# Patient Record
Sex: MALE | Race: WHITE | NOT HISPANIC OR LATINO | Employment: FULL TIME | URBAN - NONMETROPOLITAN AREA
[De-identification: names, ages, dates, MRNs, and addresses within clinical notes are randomized per-mention and may not be internally consistent; named-entity substitution may affect disease eponyms.]

---

## 2024-01-25 ENCOUNTER — OFFICE VISIT (OUTPATIENT)
Dept: URGENT CARE | Facility: CLINIC | Age: 64
End: 2024-01-25
Payer: COMMERCIAL

## 2024-01-25 VITALS
DIASTOLIC BLOOD PRESSURE: 62 MMHG | HEART RATE: 87 BPM | SYSTOLIC BLOOD PRESSURE: 118 MMHG | OXYGEN SATURATION: 98 % | RESPIRATION RATE: 18 BRPM | TEMPERATURE: 98.7 F

## 2024-01-25 DIAGNOSIS — S05.01XA ABRASION OF RIGHT CORNEA, INITIAL ENCOUNTER: Primary | ICD-10-CM

## 2024-01-25 PROCEDURE — 99203 OFFICE O/P NEW LOW 30 MIN: CPT

## 2024-01-25 PROCEDURE — 65205 REMOVE FOREIGN BODY FROM EYE: CPT

## 2024-01-25 RX ORDER — TOBRAMYCIN 3 MG/ML
1 SOLUTION/ DROPS OPHTHALMIC
Qty: 5 ML | Refills: 0 | Status: SHIPPED | OUTPATIENT
Start: 2024-01-25

## 2024-01-25 NOTE — PATIENT INSTRUCTIONS
Use prescribed antibiotic eyedrops as instructed to the right eye.  Avoid rubbing your eye.  May use saline eyedrops as provided in the clinic at home.  If you do not have any improvement in the next few days to week, follow-up with your eye doctor.  Wear protective eyewear as discussed when doing woodworking.  Go to the emergency room if any symptoms worsen.  Follow up with PCP in 3-5 days.  Proceed to  ER if symptoms worsen.  Corneal Abrasion   WHAT YOU NEED TO KNOW:   A corneal abrasion is a scratch on the cornea of your eye. The cornea is the clear layer that covers the front of your eye. A small scratch may heal in 1 to 2 days. Deeper or larger scratches may take longer to heal.        DISCHARGE INSTRUCTIONS:   Call your doctor or ophthalmologist if:   Your eye pain or vision gets worse.    You have yellow or green drainage from your eye.    You have questions or concerns about your condition or care.    Medicines:   Medicines  may be given in the form of eyedrops or ointment to help prevent an eye infection. You may also be given eyedrops to decrease pain.    Take your medicine as directed.  Contact your healthcare provider if you think your medicine is not helping or if you have side effects. Tell your provider if you are allergic to any medicine. Keep a list of the medicines, vitamins, and herbs you take. Include the amounts, and when and why you take them. Bring the list or the pill bottles to follow-up visits. Carry your medicine list with you in case of an emergency.    Care for your eyes:   Get regular eye exams.  Get your eyes checked at least every year.    Eat healthy foods.  Fresh fruits and vegetables that are rich in vitamins A and C may help with your vision. Foods such as sweet potatoes, apricots, and carrots are rich in nutrients for the eyes.            Take care of your contacts or glasses.  Store, clean, and use your contacts or glasses as directed. Replace your glasses or contact lenses as  often as your healthcare provider suggests.    Decrease eye strain.  Rest your eyes, especially after you read or use a computer for long periods of time. Get plenty of sleep at night. Use lights that reduce glare in your home, school, or workplace.    Wear dark sunglasses.  This will help prevent pain and light sensitivity. Make sure the sunglasses have UVA and UVB protection. This will protect your eyes when you go outside.    Use eyedrops safely.  If your treatment plan includes eyedrops, it is important to use them as directed. Your provider may give you detailed instructions to follow. The eyedrops may also come with safety instructions. Follow all instructions to help prevent an infection. Do not touch the tip of the bottle to your eye. Germs from your eye can spread to the medicine bottle.       Help prevent corneal abrasions:   Remove your contact lenses if your eyes feel dry or irritated.    Wash your hands if you need to touch your eyes or your face.         Trim your fingernails so you cannot scratch your eye.    Wear protective eyewear when you work with chemicals, wood, dust, or metal.    Wear protective eyewear when you play sports.    Do not wear your contacts for longer than you should.    Do not sleep with your contacts in.    Do not wear glitter makeup. Glitter can easily get into your eyes and under contact lenses.    Follow up with your doctor or ophthalmologist as directed:  Write down your questions so you remember to ask them during your visits.  © Copyright [employer] 2023 Information is for End User's use only and may not be sold, redistributed or otherwise used for commercial purposes.  The above information is an  only. It is not intended as medical advice for individual conditions or treatments. Talk to your doctor, nurse or pharmacist before following any medical regimen to see if it is safe and effective for you.

## 2024-01-25 NOTE — PROGRESS NOTES
[facility]        NAME: Steve Hammond is a 63 y.o. male  : 1960    MRN: [id number]  DATE: 2024  TIME: 1:05 PM    Assessment and Plan   Abrasion of right cornea, initial encounter [S05.01XA]  1. Abrasion of right cornea, initial encounter  tobramycin (Tobrex) 0.3 % SOLN        Eye flush used with tetracaine drops and fluorescein stain.  Small abrasion to the cornea at around the 9 o'clock position.  No visible foreign body.  Patient states that he felt like he had a foreign body since this morning, however states it floated away.  Mild conjunctival injection.  No drainage or discharge.  Will start on Tobrex.  Advised to follow-up with family doctor/eye doctor.  Advised to go to the ER if any symptoms worsen.    Patient Instructions     Use prescribed antibiotic eyedrops as instructed to the right eye.  Avoid rubbing your eye.  May use saline eyedrops as provided in the clinic at home.  If you do not have any improvement in the next few days to week, follow-up with your eye doctor.  Wear protective eyewear as discussed when doing woodworking.  Go to the emergency room if any symptoms worsen.  Follow up with PCP in 3-5 days.  Proceed to  ER if symptoms worsen.    Chief Complaint     Chief Complaint   Patient presents with    Foreign Body in Eye     Started this am   Looking up, and felt like something went into his right eye           History of Present Illness       63-year-old male presents to the clinic for foreign body sensation in right eye. States that it started this morning.  States he was looking up and felt like something went into his eye.  He denies any known particular object getting inside.  Admits to some blurred vision.  PT states that it felt like it improved on its way over to the clinic today.  Feels like the possible foreign body fell out.  Denies any drainage, fever, chills.        Review of Systems   Review of Systems   Constitutional: Negative.    HENT: Negative.      Eyes:  Positive for pain, redness and visual disturbance. Negative for photophobia.   Respiratory: Negative.     Cardiovascular: Negative.    Neurological: Negative.          Current Medications       Current Outpatient Medications:     tobramycin (Tobrex) 0.3 % SOLN, Administer 1 drop to the right eye every 4 (four) hours while awake, Disp: 5 mL, Rfl: 0    Current Allergies     Allergies as of 01/25/2024 - Reviewed 01/25/2024   Allergen Reaction Noted    Latex Dermatitis, Hives, and Rash 01/19/2010    Vancomycin Dermatitis, Hives, and Rash 01/19/2010    Fentanyl GI Intolerance 07/13/2022            The following portions of the patient's history were reviewed and updated as appropriate: allergies, current medications, past family history, past medical history, past social history, past surgical history and problem list.     Past Medical History:   Diagnosis Date    Bowel obstruction (HCC)        Past Surgical History:   Procedure Laterality Date    ABDOMINAL SURGERY      CHOLECYSTECTOMY         No family history on file.      Medications have been verified.        Objective   /62   Pulse 87   Temp 98.7 °F (37.1 °C)   Resp 18   SpO2 98%        Physical Exam     Physical Exam  Constitutional:       General: He is not in acute distress.     Appearance: Normal appearance. He is not ill-appearing or diaphoretic.   HENT:      Head: Normocephalic and atraumatic.   Eyes:      General: Lids are normal.         Right eye: No foreign body, discharge or hordeolum.         Left eye: No discharge or hordeolum.      Extraocular Movements: Extraocular movements intact.      Right eye: Normal extraocular motion and no nystagmus.      Left eye: Normal extraocular motion and no nystagmus.      Conjunctiva/sclera:      Right eye: Right conjunctiva is injected.      Left eye: Left conjunctiva is not injected.      Pupils: Pupils are equal, round, and reactive to light.      Comments: No visible foreign body in the right  "eye.  It was irrigated extensively with saline eyewash.  No drainage or discharge.   Cardiovascular:      Rate and Rhythm: Normal rate and regular rhythm.      Pulses: Normal pulses.      Heart sounds: Normal heart sounds.   Pulmonary:      Effort: Pulmonary effort is normal. No respiratory distress.      Breath sounds: Normal breath sounds.   Skin:     General: Skin is warm and dry.      Capillary Refill: Capillary refill takes less than 2 seconds.   Neurological:      General: No focal deficit present.      Mental Status: He is alert and oriented to person, place, and time. Mental status is at baseline.   Psychiatric:         Mood and Affect: Mood normal.       Universal Protocol:  Consent: Verbal consent obtained.  Risks and benefits: risks, benefits and alternatives were discussed  Consent given by: patient  Time out: Immediately prior to procedure a \"time out\" was called to verify the correct patient, procedure, equipment, support staff and site/side marked as required.  Timeout called at: 1/25/2024 12:55 PM.  Patient understanding: patient states understanding of the procedure being performed  Patient consent: the patient's understanding of the procedure matches consent given  Required items: required blood products, implants, devices, and special equipment available  Patient identity confirmed: verbally with patient  Foreign body removal    Date/Time: 1/25/2024 1:00 PM    Performed by: Carlos Lee PA-C  Authorized by: Carlos Lee PA-C  Body area: eye  Location: right eye.    Anesthesia:  Local Anesthetic: tetracaine drops  Anesthetic total (drops): 1  Sedation:  Patient sedated: no  Patient restrained: no  Patient cooperative: yes  Localization method: eyelid eversion (woods lamp)  Eye examined with fluorescein.  Fluorescein uptake.  Corneal abrasion size: small  Corneal abrasion location: lateral  No residual rust ring present.  Depth: superficial  0 objects recovered.  Post-procedure " assessment: no visible foreign body.  Patient tolerance: patient tolerated the procedure well with no immediate complications  Comments: Tobrex antibiotic eye drops sent to pharmacy to begin today.

## 2025-04-07 ENCOUNTER — OFFICE VISIT (OUTPATIENT)
Dept: URGENT CARE | Facility: CLINIC | Age: 65
End: 2025-04-07
Payer: COMMERCIAL

## 2025-04-07 VITALS
DIASTOLIC BLOOD PRESSURE: 72 MMHG | RESPIRATION RATE: 14 BRPM | SYSTOLIC BLOOD PRESSURE: 124 MMHG | OXYGEN SATURATION: 95 % | HEART RATE: 106 BPM | BODY MASS INDEX: 23.48 KG/M2 | WEIGHT: 164 LBS | HEIGHT: 70 IN | TEMPERATURE: 99.1 F

## 2025-04-07 DIAGNOSIS — J18.9 PNEUMONIA OF LEFT LOWER LOBE DUE TO INFECTIOUS ORGANISM: Primary | ICD-10-CM

## 2025-04-07 PROCEDURE — 99214 OFFICE O/P EST MOD 30 MIN: CPT | Performed by: PHYSICIAN ASSISTANT

## 2025-04-07 RX ORDER — BUDESONIDE AND FORMOTEROL FUMARATE DIHYDRATE 80; 4.5 UG/1; UG/1
AEROSOL RESPIRATORY (INHALATION)
COMMUNITY
Start: 2025-03-31

## 2025-04-07 RX ORDER — BENZONATATE 200 MG/1
200 CAPSULE ORAL 3 TIMES DAILY PRN
Qty: 20 CAPSULE | Refills: 0 | Status: SHIPPED | OUTPATIENT
Start: 2025-04-07

## 2025-04-07 RX ORDER — AZITHROMYCIN 250 MG/1
TABLET, FILM COATED ORAL
Qty: 6 TABLET | Refills: 0 | Status: SHIPPED | OUTPATIENT
Start: 2025-04-07 | End: 2025-04-11

## 2025-04-07 RX ORDER — ALBUTEROL SULFATE 90 UG/1
2 INHALANT RESPIRATORY (INHALATION) EVERY 6 HOURS PRN
Qty: 8.5 G | Refills: 0 | Status: SHIPPED | OUTPATIENT
Start: 2025-04-07

## 2025-04-07 RX ORDER — ATORVASTATIN CALCIUM 20 MG/1
TABLET, FILM COATED ORAL
COMMUNITY
Start: 2025-03-30

## 2025-04-07 NOTE — PROGRESS NOTES
[facility]        NAME: Steve Hammond is a 64 y.o. male  : 1960    MRN: [id number]  DATE: 2025  TIME: 8:27 AM    Assessment and Plan   Pneumonia of left lower lobe due to infectious organism [J18.9]  1. Pneumonia of left lower lobe due to infectious organism  benzonatate (TESSALON) 200 MG capsule    albuterol (ProAir HFA) 90 mcg/act inhaler    azithromycin (ZITHROMAX) 250 mg tablet    amoxicillin-clavulanate (AUGMENTIN) 875-125 mg per tablet          Results        Patient Instructions     Assessment & Plan    Tessalon, Albuterol, Azithromycin and Augmentin as prescribed.  Take Augmentin with food.   Fluids and rest (Warm water with honey and lemon)  Tylenol for pain fever  Follow up with PCP in 3-5 days.  Proceed to  ER if symptoms worsen.    If tests have been performed at [facility], our office will contact you with results if changes need to be made to the care plan discussed with you at the visit.  You can review your full results on [facility] MyChart.    Eat yogurt with live and active cultures and/or take a probiotic at least 3 hours before or after antibiotic dose. Monitor stool for diarrhea and/or blood. If this occurs, contact primary care doctor ASAP.       Chief Complaint     Chief Complaint   Patient presents with    Cough     Started yesterday. Still has headache. OTC Nyquill taken last night.     Headache    Wheezing    chest congestion         History of Present Illness     History of Present Illness      Cough  This is a new problem. The current episode started yesterday. Associated symptoms include headaches, myalgias and wheezing. Pertinent negatives include no chills, ear pain, fever, postnasal drip, rash, rhinorrhea, sore throat or shortness of breath. Treatments tried: OTC NyQuil.       Review of Systems   Review of Systems   Constitutional:  Negative for chills and fever.   HENT:  Negative for congestion, dental problem, ear discharge, ear pain, facial swelling,  postnasal drip, rhinorrhea, sinus pressure, sinus pain, sneezing, sore throat and trouble swallowing.    Eyes:  Negative for itching.   Respiratory:  Positive for cough and wheezing. Negative for chest tightness and shortness of breath.    Gastrointestinal:  Negative for diarrhea, nausea and vomiting.   Musculoskeletal:  Positive for myalgias.   Skin:  Negative for rash.   Neurological:  Positive for headaches. Negative for weakness and light-headedness.         Current Medications       Current Outpatient Medications:     albuterol (ProAir HFA) 90 mcg/act inhaler, Inhale 2 puffs every 6 (six) hours as needed for wheezing or shortness of breath, Disp: 8.5 g, Rfl: 0    amoxicillin-clavulanate (AUGMENTIN) 875-125 mg per tablet, Take 1 tablet by mouth every 12 (twelve) hours for 7 days, Disp: 14 tablet, Rfl: 0    atorvastatin (LIPITOR) 20 mg tablet, take 1 tablet by mouth every day at night, Disp: , Rfl:     azithromycin (ZITHROMAX) 250 mg tablet, Take 2 tablets today then 1 tablet daily x 4 days, Disp: 6 tablet, Rfl: 0    benzonatate (TESSALON) 200 MG capsule, Take 1 capsule (200 mg total) by mouth 3 (three) times a day as needed for cough, Disp: 20 capsule, Rfl: 0    budesonide-formoterol (SYMBICORT) 80-4.5 MCG/ACT inhaler, PLEASE SEE ATTACHED FOR DETAILED DIRECTIONS, Disp: , Rfl:     tobramycin (Tobrex) 0.3 % SOLN, Administer 1 drop to the right eye every 4 (four) hours while awake, Disp: 5 mL, Rfl: 0    Current Allergies     Allergies as of 04/07/2025 - Reviewed 04/07/2025   Allergen Reaction Noted    Latex Dermatitis, Hives, and Rash 01/19/2010    Vancomycin Dermatitis, Hives, and Rash 01/19/2010    Fentanyl GI Intolerance 07/13/2022            The following portions of the patient's history were reviewed and updated as appropriate: allergies, current medications, past family history, past medical history, past social history, past surgical history and problem list.     Past Medical History:   Diagnosis Date     "Bowel obstruction (HCC)        Past Surgical History:   Procedure Laterality Date    ABDOMINAL SURGERY      CHOLECYSTECTOMY         Family History   Problem Relation Age of Onset    No Known Problems Mother     No Known Problems Father          Medications have been verified.        Objective   /72   Pulse (!) 106   Temp 99.1 °F (37.3 °C)   Resp 14   Ht 5' 10\" (1.778 m)   Wt 74.4 kg (164 lb)   SpO2 95%   BMI 23.53 kg/m²   No LMP for male patient.       Physical Exam     Physical Exam  Vitals reviewed.   Constitutional:       General: He is not in acute distress.     Appearance: He is well-developed. He is not diaphoretic.   HENT:      Head: Normocephalic.      Right Ear: Tympanic membrane, ear canal and external ear normal.      Left Ear: Tympanic membrane, ear canal and external ear normal.      Nose: Nose normal.      Mouth/Throat:      Pharynx: No oropharyngeal exudate or posterior oropharyngeal erythema.   Eyes:      Conjunctiva/sclera: Conjunctivae normal.   Cardiovascular:      Rate and Rhythm: Normal rate and regular rhythm.      Heart sounds: Normal heart sounds. No murmur heard.     No friction rub. No gallop.   Pulmonary:      Effort: Pulmonary effort is normal. No respiratory distress.      Breath sounds: Rales (L lower posterior lung field) present. No wheezing or rhonchi.   Lymphadenopathy:      Cervical: No cervical adenopathy.   Skin:     General: Skin is warm.   Neurological:      Mental Status: He is alert and oriented to person, place, and time.   Psychiatric:         Behavior: Behavior normal.         Thought Content: Thought content normal.         Judgment: Judgment normal.                     "

## 2025-04-07 NOTE — LETTER
April 7, 2025     Patient: Steve Hammond   YOB: 1960   Date of Visit: 4/7/2025       To Whom It May Concern:    It is my medical opinion that Steve Hammond may return to work on 4/1//2025 or sooner if symptoms improve .    If you have any questions or concerns, please don't hesitate to call.         Sincerely,        Casie Marcos PA-C    CC: No Recipients

## 2025-04-07 NOTE — PATIENT INSTRUCTIONS
Tessalon, Albuterol, Azithromycin and Augmentin as prescribed.  Take Augmentin with food.   Fluids and rest (Warm water with honey and lemon)  Tylenol for pain fever  Follow up with PCP in 3-5 days.  Proceed to  ER if symptoms worsen.    If tests have been performed at [facility], our office will contact you with results if changes need to be made to the care plan discussed with you at the visit.  You can review your full results on [facility] MyChart.    Eat yogurt with live and active cultures and/or take a probiotic at least 3 hours before or after antibiotic dose. Monitor stool for diarrhea and/or blood. If this occurs, contact primary care doctor ASAP.